# Patient Record
Sex: MALE | Race: WHITE | NOT HISPANIC OR LATINO | URBAN - METROPOLITAN AREA
[De-identification: names, ages, dates, MRNs, and addresses within clinical notes are randomized per-mention and may not be internally consistent; named-entity substitution may affect disease eponyms.]

---

## 2018-05-28 ENCOUNTER — EMERGENCY (EMERGENCY)
Facility: HOSPITAL | Age: 28
LOS: 0 days | Discharge: HOME | End: 2018-05-28
Admitting: PHYSICIAN ASSISTANT

## 2018-05-28 VITALS
HEART RATE: 107 BPM | OXYGEN SATURATION: 99 % | SYSTOLIC BLOOD PRESSURE: 135 MMHG | RESPIRATION RATE: 18 BRPM | TEMPERATURE: 96 F | DIASTOLIC BLOOD PRESSURE: 83 MMHG

## 2018-05-28 DIAGNOSIS — R22.0 LOCALIZED SWELLING, MASS AND LUMP, HEAD: ICD-10-CM

## 2018-05-28 DIAGNOSIS — Z88.0 ALLERGY STATUS TO PENICILLIN: ICD-10-CM

## 2018-05-28 DIAGNOSIS — Z88.8 ALLERGY STATUS TO OTHER DRUGS, MEDICAMENTS AND BIOLOGICAL SUBSTANCES: ICD-10-CM

## 2018-05-28 DIAGNOSIS — F17.200 NICOTINE DEPENDENCE, UNSPECIFIED, UNCOMPLICATED: ICD-10-CM

## 2018-05-28 RX ORDER — AMOXICILLIN 250 MG/5ML
500 SUSPENSION, RECONSTITUTED, ORAL (ML) ORAL ONCE
Qty: 0 | Refills: 0 | Status: COMPLETED | OUTPATIENT
Start: 2018-05-28 | End: 2018-05-28

## 2018-05-28 RX ORDER — IBUPROFEN 200 MG
600 TABLET ORAL ONCE
Qty: 0 | Refills: 0 | Status: COMPLETED | OUTPATIENT
Start: 2018-05-28 | End: 2018-05-28

## 2018-05-28 RX ORDER — AMOXICILLIN 250 MG/5ML
1 SUSPENSION, RECONSTITUTED, ORAL (ML) ORAL
Qty: 20 | Refills: 0 | OUTPATIENT
Start: 2018-05-28 | End: 2018-06-06

## 2018-05-28 RX ADMIN — Medication 500 MILLIGRAM(S): at 22:03

## 2018-05-28 RX ADMIN — Medication 600 MILLIGRAM(S): at 22:03

## 2018-05-28 NOTE — ED PROVIDER NOTE - PHYSICAL EXAMINATION
VITAL SIGNS: I have reviewed the initial vital signs.   CONSTITUTIONAL: Awake, alert. Well-developed; well-nourished; in no distress. Non-toxic appearing.   SKIN: No rash, vesicles/lesion, abrasions or lacerations. No ecchymosis or signs of trauma. Cap refill < 2 secs.   HEAD: Normocephalic; atraumatic.   ENT: Airway patent. MMM. Oropharynx clear with no erythema, exudates or tonsillar enlargement. Uvula midline. + swelling to left side of mandibular area, mild ttp, no trismus. Good dental hygiene; tooth #[id number] area with visible crack; no dental ttp, no dental abscess, no gingival irritation or bleeding.   NECK: Supple; non-tender. No lymphadenopathy.   CARD: No chest wall deformity or tenderness. S1, S2 normal; no murmurs, gallops, or rubs. Regular rate and rhythm.  RESP: Good air movement. Lungs CTAB. No crackles, wheezes, rales or rhonchi.  PSYCH: Cooperative, appropriate.

## 2018-05-28 NOTE — ED PROVIDER NOTE - OBJECTIVE STATEMENT
Pt is a 29 y/o Male, no PMHX, presents to ED with left sided facial swelling. Pt reports he woke up this AM with it. States he has a cracked tooth on same side he thinks might be causing the swelling. Has private dentist he can try to get into this week. Pt reports pain in area last several days, taking Tylenol with some relief. Pt denies fever, chills, difficulty swallowing, sore throat, trauma/injury.

## 2018-05-28 NOTE — ED PROVIDER NOTE - PROGRESS NOTE DETAILS
Pt given Motrin & Amox in ED. Sent amox & naproxen to pharmacy, Pt will call private dentist tomorrow for appt, also given dental clinic info. Signs and symptoms which should prompt return discussed at length and verbalized agreement. Pt given Motrin & Amox in ED. Sent Amox & Naproxen to pharmacy, Pt will call private dentist tomorrow for appt, also given dental clinic info. Signs and symptoms which should prompt return discussed at length and verbalized agreement.

## 2018-05-28 NOTE — ED PROVIDER NOTE - NS ED ROS FT
Except as documented in HPI, all other ROS negative.   GENERAL: Denies fever/chills, loss of appetite/weight or fatigue.  SKIN: Denies rashes, abrasions, lacerations, ecchymosis, erythema, or edema.  HEAD: Denies headache, dizziness or trauma.  ENT: + left sided facial swelling.    CARDIAC: Denies chest pain, palpitations, or SOB.   RESPIRATORY: Denies SOB, cough, hemoptysis or wheezing.   GI: Denies abdominal pain, n/v/d.   MSK: Denies myalgias, bony deformity or pain.   NEURO: Denies paresthesias, tingling or weakness.

## 2018-05-29 ENCOUNTER — OUTPATIENT (OUTPATIENT)
Dept: OUTPATIENT SERVICES | Facility: HOSPITAL | Age: 28
LOS: 1 days | Discharge: HOME | End: 2018-05-29